# Patient Record
Sex: MALE | Race: OTHER | HISPANIC OR LATINO | URBAN - METROPOLITAN AREA
[De-identification: names, ages, dates, MRNs, and addresses within clinical notes are randomized per-mention and may not be internally consistent; named-entity substitution may affect disease eponyms.]

---

## 2022-05-13 ENCOUNTER — APPOINTMENT (RX ONLY)
Dept: URBAN - METROPOLITAN AREA CLINIC 332 | Facility: CLINIC | Age: 37
Setting detail: DERMATOLOGY
End: 2022-05-13

## 2022-05-13 DIAGNOSIS — D22 MELANOCYTIC NEVI: ICD-10-CM

## 2022-05-13 PROBLEM — D22.39 MELANOCYTIC NEVI OF OTHER PARTS OF FACE: Status: ACTIVE | Noted: 2022-05-13

## 2022-05-13 PROCEDURE — 99212 OFFICE O/P EST SF 10 MIN: CPT

## 2022-05-13 PROCEDURE — ? COUNSELING

## 2022-05-13 PROCEDURE — ? ADDITIONAL NOTES

## 2022-05-13 ASSESSMENT — LOCATION SIMPLE DESCRIPTION DERM: LOCATION SIMPLE: LEFT FOREHEAD

## 2022-05-13 ASSESSMENT — LOCATION ZONE DERM: LOCATION ZONE: FACE

## 2022-05-13 ASSESSMENT — LOCATION DETAILED DESCRIPTION DERM: LOCATION DETAILED: LEFT INFERIOR MEDIAL FOREHEAD

## 2022-05-13 NOTE — PROCEDURE: ADDITIONAL NOTES
Additional Notes: Can remove in the future if he would like.
Render Risk Assessment In Note?: no
Detail Level: Simple